# Patient Record
Sex: FEMALE | Race: WHITE | NOT HISPANIC OR LATINO | Employment: FULL TIME | URBAN - METROPOLITAN AREA
[De-identification: names, ages, dates, MRNs, and addresses within clinical notes are randomized per-mention and may not be internally consistent; named-entity substitution may affect disease eponyms.]

---

## 2022-02-22 ENCOUNTER — APPOINTMENT (OUTPATIENT)
Dept: RADIOLOGY | Facility: CLINIC | Age: 57
End: 2022-02-22
Payer: COMMERCIAL

## 2022-02-22 ENCOUNTER — OFFICE VISIT (OUTPATIENT)
Dept: OBGYN CLINIC | Facility: CLINIC | Age: 57
End: 2022-02-22
Payer: COMMERCIAL

## 2022-02-22 VITALS — WEIGHT: 144.6 LBS

## 2022-02-22 DIAGNOSIS — F41.9 ANXIETY: ICD-10-CM

## 2022-02-22 DIAGNOSIS — M25.512 LEFT SHOULDER PAIN, UNSPECIFIED CHRONICITY: ICD-10-CM

## 2022-02-22 DIAGNOSIS — M25.512 LEFT SHOULDER PAIN, UNSPECIFIED CHRONICITY: Primary | ICD-10-CM

## 2022-02-22 DIAGNOSIS — M75.32 CALCIFIC TENDONITIS OF LEFT SHOULDER: ICD-10-CM

## 2022-02-22 PROCEDURE — 73030 X-RAY EXAM OF SHOULDER: CPT

## 2022-02-22 PROCEDURE — 99204 OFFICE O/P NEW MOD 45 MIN: CPT | Performed by: ORTHOPAEDIC SURGERY

## 2022-02-22 RX ORDER — AMLODIPINE BESYLATE 5 MG/1
5 TABLET ORAL DAILY
COMMUNITY
Start: 2022-01-12

## 2022-02-22 RX ORDER — DIAZEPAM 5 MG/1
5 TABLET ORAL
Qty: 2 TABLET | Refills: 0 | Status: SHIPPED | OUTPATIENT
Start: 2022-02-22

## 2022-02-22 NOTE — PROGRESS NOTES
Assessment/Plan:  1  Left shoulder pain, unspecified chronicity  XR shoulder 2+ vw left   2  Calcific tendonitis of left shoulder  Ambulatory Referral to Interventional Radiology   3  Anxiety  diazepam (VALIUM) 5 mg tablet       Scribe Attestation    I,:  Narenamandacheyanne Lew Cuadra am acting as a scribe while in the presence of the attending physician :       I,:  Audra Aaron MD personally performed the services described in this documentation    as scribed in my presence :             I reviewed the xrays with Nae Henry today  On x-ray there is evidence of calcific tendinitis  This explains the lack of abduction due to pain and weakness  We discussed treatment options moving forward  In my opinion her best option at this point is a referral to Interventional Radialogy for ultrasound guided lavage of the calcium  Her 2nd option would be of left shoulder arthroscopy to remove the calcium deposit and possible repair of rotator cuff  This would require a significant amount of rehabilitation as well as a sling for 6 weeks following surgery  With the ultrasound-guided lavage there is little to no limitations postprocedure  Nae Henry has extreme anxiety in regards to treatment with needles  We discussed the procedure in great detail  She prefers proceeding with the lavage  I did provide her a prescription of valium to take 30 minutes prior to the procedure to help her relax  She will take 2, 5 mg tablets  She verbally stated she understood  My staff will assist her in arranging for a date  If she is still experiencing discomfort 3 months following the procedure she can return to see me  There is a small percentage that do not find successful treatment from the lavage  Those patients typically will require subsequent arthroscopy  Subjective:   Mehreen Brink is a 64 y o  female who presents to the office today for evaluation of her left shoulder    Nae Henry states her shoulder has been sore for the past several years but notes a worsening of her condition  She complains of the intermittent sharp and stabbing pain is considered severe at times particularly with abducting left shoulder  She is no longer able to perform pushups as she did previously  Simply pouring a glass of wine is painful  There are days that she has a mild ache at rest and this is located about the anterior aspect of the shoulder that will radiate into the left upper arm  She will take ibuprofen before attending a fitness session at her gym  She does not feel this helps  She denies distal paresthesias  Review of Systems   Constitutional: Negative for chills, fever and unexpected weight change  HENT: Negative for hearing loss, nosebleeds and sore throat  Eyes: Negative for pain, redness and visual disturbance  Respiratory: Negative for cough, shortness of breath and wheezing  Cardiovascular: Negative for chest pain, palpitations and leg swelling  Gastrointestinal: Negative for abdominal pain, nausea and vomiting  Endocrine: Negative for polydipsia and polyuria  Genitourinary: Negative for dysuria and hematuria  Musculoskeletal:        See HPI   Skin: Negative for rash and wound  Neurological: Negative for dizziness, numbness and headaches  Psychiatric/Behavioral: Negative for decreased concentration and suicidal ideas  The patient is not nervous/anxious  Past Medical History:   Diagnosis Date    Hypertension     Pernicious anemia        Past Surgical History:   Procedure Laterality Date     SECTION      x3    PARATHYROIDECTOMY         Family History   Problem Relation Age of Onset    No Known Problems Mother     No Known Problems Father        Social History     Occupational History    Not on file   Tobacco Use    Smoking status: Former Smoker    Smokeless tobacco: Never Used   Vaping Use    Vaping Use: Never used   Substance and Sexual Activity    Alcohol use:  Yes    Drug use: Never    Sexual activity: Not on file         Current Outpatient Medications:     amLODIPine (NORVASC) 5 mg tablet, Take 5 mg by mouth daily, Disp: , Rfl:     calcium carbonate (TUMS ULTRA) 1000 MG chewable tablet, Chew 2,000 mg, Disp: , Rfl:     diazepam (VALIUM) 5 mg tablet, Take 1 tablet (5 mg total) by mouth 30 min pre-procedure, Disp: 2 tablet, Rfl: 0    Allergies   Allergen Reactions    Phenobarbital Other (See Comments), Fever, Headache, Hives and Rash    Phenytoin Hives, Rash, Other (See Comments), Headache and Fever     (Dilantin)       Objective: There were no vitals filed for this visit  Left Shoulder Exam     Tenderness   The patient is experiencing no tenderness  Range of Motion   Active abduction: 100 (Painful)   Passive abduction: 110 (Painful)   External rotation: 80   Forward flexion: 160   Internal rotation 0 degrees: T12     Muscle Strength   Abduction: 4/5   Internal rotation: 5/5   External rotation: 5/5   Supraspinatus: 3/5   Subscapularis: 5/5   Biceps: 5/5     Tests   Impingement: positive    Other   Sensation: normal  Pulse: present (2+ radial)             Physical Exam  Vitals reviewed  Constitutional:       Appearance: She is well-developed  HENT:      Head: Normocephalic and atraumatic  Eyes:      General:         Right eye: No discharge  Left eye: No discharge  Conjunctiva/sclera: Conjunctivae normal    Cardiovascular:      Rate and Rhythm: Regular rhythm  Pulmonary:      Effort: Pulmonary effort is normal  No respiratory distress  Breath sounds: No stridor  Musculoskeletal:      Cervical back: Normal range of motion and neck supple  Skin:     General: Skin is warm and dry  Neurological:      Mental Status: She is alert and oriented to person, place, and time     Psychiatric:         Behavior: Behavior normal          I have personally reviewed pertinent films in PACS and my interpretation is as follows:  X-rays of the left shoulder taken today demonstrate calcific tendinitis  There is no evidence of acute fracture or other osseous abnormality

## 2022-04-05 ENCOUNTER — HOSPITAL ENCOUNTER (OUTPATIENT)
Dept: RADIOLOGY | Facility: HOSPITAL | Age: 57
Discharge: HOME | End: 2022-04-05
Attending: ORTHOPAEDIC SURGERY